# Patient Record
Sex: FEMALE | Race: WHITE | NOT HISPANIC OR LATINO | URBAN - METROPOLITAN AREA
[De-identification: names, ages, dates, MRNs, and addresses within clinical notes are randomized per-mention and may not be internally consistent; named-entity substitution may affect disease eponyms.]

---

## 2017-08-07 ENCOUNTER — HISTORICAL (OUTPATIENT)
Dept: LAB | Facility: HOSPITAL | Age: 52
End: 2017-08-07

## 2017-08-07 LAB
ABS NEUT (OLG): NORMAL X10(3)/MCL (ref 2.1–9.2)
ALBUMIN SERPL-MCNC: 3.7 GM/DL (ref 3.4–5)
ALP SERPL-CCNC: 128 UNIT/L (ref 46–116)
ALT SERPL-CCNC: 22 UNIT/L (ref 12–78)
AST SERPL-CCNC: 16 UNIT/L (ref 15–37)
BASOPHILS # BLD AUTO: NORMAL X10(3)/MCL
BASOPHILS NFR BLD AUTO: NORMAL % (ref 0–2)
BILIRUB SERPL-MCNC: 0.5 MG/DL (ref 0.2–1)
BILIRUBIN DIRECT+TOT PNL SERPL-MCNC: 0.09 MG/DL (ref 0–0.2)
BILIRUBIN DIRECT+TOT PNL SERPL-MCNC: 0.41 MG/DL (ref 0–0.8)
BUN SERPL-MCNC: NORMAL MG/DL (ref 7–18)
CALCIUM SERPL-MCNC: NORMAL MG/DL (ref 8.5–10.1)
CHLORIDE SERPL-SCNC: NORMAL MMOL/L (ref 98–107)
CHOLEST SERPL-MCNC: 213 MG/DL (ref 0–200)
CHOLEST/HDLC SERPL: 5.2 {RATIO} (ref 0–4)
CO2 SERPL-SCNC: NORMAL MMOL/L (ref 21–32)
CREAT SERPL-MCNC: NORMAL MG/DL (ref 0.6–1.3)
EOSINOPHIL # BLD AUTO: NORMAL X10(3)/MCL
EOSINOPHIL NFR BLD AUTO: NORMAL %
ERYTHROCYTE [DISTWIDTH] IN BLOOD BY AUTOMATED COUNT: NORMAL % (ref 11.5–17)
GLUCOSE SERPL-MCNC: NORMAL MG/DL (ref 74–106)
HCT VFR BLD AUTO: NORMAL % (ref 37–47)
HDLC SERPL-MCNC: 41 MG/DL (ref 40–60)
HGB BLD-MCNC: NORMAL GM/DL (ref 12–16)
LDLC SERPL CALC-MCNC: 156 MG/DL (ref 0–129)
LYMPHOCYTES # BLD AUTO: NORMAL X10(3)/MCL
LYMPHOCYTES NFR BLD AUTO: NORMAL % (ref 13–40)
MCH RBC QN AUTO: NORMAL PG (ref 27–31)
MCHC RBC AUTO-ENTMCNC: NORMAL GM/DL (ref 33–36)
MCV RBC AUTO: NORMAL FL (ref 80–94)
MONOCYTES # BLD AUTO: NORMAL X10(3)/MCL
MONOCYTES NFR BLD AUTO: NORMAL % (ref 2–11)
NEUTROPHILS # BLD AUTO: NORMAL X10(3)/MCL (ref 2.1–9.2)
NEUTROPHILS NFR BLD AUTO: NORMAL % (ref 47–80)
PLATELET # BLD AUTO: NORMAL X10(3)/MCL (ref 130–400)
PMV BLD AUTO: NORMAL FL (ref 7.4–10.4)
POTASSIUM SERPL-SCNC: NORMAL MMOL/L (ref 3.5–5.1)
PROT SERPL-MCNC: 6.4 GM/DL (ref 6.4–8.2)
RBC # BLD AUTO: NORMAL X10(6)/MCL (ref 4.2–5.4)
SODIUM SERPL-SCNC: NORMAL MMOL/L (ref 136–145)
TRIGL SERPL-MCNC: 79 MG/DL
VLDLC SERPL CALC-MCNC: 16 MG/DL
WBC # SPEC AUTO: NORMAL X10(3)/MCL (ref 4.5–11.5)

## 2019-02-06 ENCOUNTER — HISTORICAL (OUTPATIENT)
Dept: LAB | Facility: HOSPITAL | Age: 54
End: 2019-02-06

## 2019-02-06 LAB
APTT PPP: 31.4 SECOND(S) (ref 24.8–36.9)
DEPRECATED CALCIDIOL+CALCIFEROL SERPL-MC: 20.89 NG/ML (ref 30–80)
ERYTHROCYTE [SEDIMENTATION RATE] IN BLOOD: 13 MM/HR (ref 0–20)
FERRITIN SERPL-MCNC: 141.2 NG/ML (ref 8–388)
HBV CORE IGM SERPL QL IA: NEGATIVE
HBV SURFACE AG SERPL QL IA: NEGATIVE
HCV AB SERPL QL IA: NEGATIVE
INR PPP: 1 (ref 0–1.3)
PROTHROMBIN TIME: 13.4 SECOND(S) (ref 12.2–14.7)
VIT B12 SERPL-MCNC: 584 PG/ML (ref 193–986)

## 2019-10-12 ENCOUNTER — HISTORICAL (OUTPATIENT)
Dept: LAB | Facility: HOSPITAL | Age: 54
End: 2019-10-12

## 2019-10-12 LAB
ALBUMIN SERPL-MCNC: 3.6 GM/DL (ref 3.4–5)
ALP SERPL-CCNC: 135 UNIT/L (ref 46–116)
ALT SERPL-CCNC: 20 UNIT/L (ref 12–78)
AST SERPL-CCNC: 17 UNIT/L (ref 15–37)
BILIRUB SERPL-MCNC: 0.4 MG/DL (ref 0.2–1)
BILIRUBIN DIRECT+TOT PNL SERPL-MCNC: 0.1 MG/DL (ref 0–0.2)
BILIRUBIN DIRECT+TOT PNL SERPL-MCNC: 0.3 MG/DL (ref 0–0.8)
CHOLEST SERPL-MCNC: 204 MG/DL (ref 0–200)
CHOLEST/HDLC SERPL: 5.4 {RATIO} (ref 0–4)
HDLC SERPL-MCNC: 38 MG/DL (ref 40–60)
LDLC SERPL CALC-MCNC: 129 MG/DL (ref 0–129)
PROT SERPL-MCNC: 6.5 GM/DL (ref 6.4–8.2)
TRIGL SERPL-MCNC: 185 MG/DL
VLDLC SERPL CALC-MCNC: 37 MG/DL

## 2020-08-21 ENCOUNTER — HISTORICAL (OUTPATIENT)
Dept: ADMINISTRATIVE | Facility: HOSPITAL | Age: 55
End: 2020-08-21

## 2020-08-21 LAB
APPEARANCE, UA: ABNORMAL
BACTERIA SPEC CULT: ABNORMAL /HPF
BILIRUB UR QL STRIP: NEGATIVE
COLOR UR: YELLOW
GLUCOSE (UA): NEGATIVE
HGB UR QL STRIP: ABNORMAL
KETONES UR QL STRIP: NEGATIVE
LEUKOCYTE ESTERASE UR QL STRIP: NEGATIVE
NITRITE UR QL STRIP: NEGATIVE
PH UR STRIP: 5.5 [PH] (ref 5–9)
PROT UR QL STRIP: NEGATIVE
RBC #/AREA URNS HPF: ABNORMAL /[HPF]
SP GR UR STRIP: 1.02 (ref 1–1.03)
SQUAMOUS EPITHELIAL, UA: ABNORMAL
UROBILINOGEN UR STRIP-ACNC: 0.2
WBC #/AREA URNS HPF: ABNORMAL /[HPF]

## 2020-12-11 ENCOUNTER — HISTORICAL (OUTPATIENT)
Dept: LAB | Facility: HOSPITAL | Age: 55
End: 2020-12-11

## 2020-12-11 LAB
ABS NEUT (OLG): 3.86 X10(3)/MCL (ref 2.1–9.2)
ALBUMIN SERPL-MCNC: 3.8 GM/DL (ref 3.5–5)
ALBUMIN/GLOB SERPL: 1.3 RATIO (ref 1.1–2)
ALP SERPL-CCNC: 149 UNIT/L (ref 40–150)
ALT SERPL-CCNC: 13 UNIT/L (ref 0–55)
APPEARANCE, UA: CLEAR
AST SERPL-CCNC: 15 UNIT/L (ref 5–34)
BACTERIA SPEC CULT: ABNORMAL
BASOPHILS # BLD AUTO: 0 X10(3)/MCL (ref 0–0.2)
BASOPHILS NFR BLD AUTO: 0 %
BILIRUB SERPL-MCNC: 0.4 MG/DL
BILIRUB UR QL STRIP: NEGATIVE
BILIRUBIN DIRECT+TOT PNL SERPL-MCNC: 0.2 MG/DL (ref 0–0.5)
BILIRUBIN DIRECT+TOT PNL SERPL-MCNC: 0.2 MG/DL (ref 0–0.8)
BUN SERPL-MCNC: 11.2 MG/DL (ref 9.8–20.1)
CALCIUM SERPL-MCNC: 9.3 MG/DL (ref 8.4–10.2)
CHLORIDE SERPL-SCNC: 106 MMOL/L (ref 98–107)
CHOLEST SERPL-MCNC: 198 MG/DL
CHOLEST/HDLC SERPL: 5 {RATIO} (ref 0–5)
CO2 SERPL-SCNC: 30 MMOL/L (ref 22–29)
COLOR UR: YELLOW
CREAT SERPL-MCNC: 0.62 MG/DL (ref 0.55–1.02)
DEPRECATED CALCIDIOL+CALCIFEROL SERPL-MC: 54.4 NG/ML (ref 30–80)
EOSINOPHIL # BLD AUTO: 0.1 X10(3)/MCL (ref 0–0.9)
EOSINOPHIL NFR BLD AUTO: 2 %
ERYTHROCYTE [DISTWIDTH] IN BLOOD BY AUTOMATED COUNT: 11.8 % (ref 11.5–17)
EST. AVERAGE GLUCOSE BLD GHB EST-MCNC: 102.5 MG/DL
GLOBULIN SER-MCNC: 2.9 GM/DL (ref 2.4–3.5)
GLUCOSE (UA): NEGATIVE
GLUCOSE SERPL-MCNC: 93 MG/DL (ref 74–100)
HBA1C MFR BLD: 5.2 %
HCT VFR BLD AUTO: 57.5 % (ref 37–47)
HDLC SERPL-MCNC: 42 MG/DL (ref 35–60)
HGB BLD-MCNC: 17.5 GM/DL (ref 12–16)
HGB UR QL STRIP: NEGATIVE
IMM GRANULOCYTES # BLD AUTO: 0.01 % (ref 0–0.02)
IMM GRANULOCYTES NFR BLD AUTO: 0.2 % (ref 0–0.43)
KETONES UR QL STRIP: NEGATIVE
LDLC SERPL CALC-MCNC: 125 MG/DL (ref 50–140)
LEUKOCYTE ESTERASE UR QL STRIP: NEGATIVE
LYMPHOCYTES # BLD AUTO: 1.9 X10(3)/MCL (ref 0.6–4.6)
LYMPHOCYTES NFR BLD AUTO: 31 %
MCH RBC QN AUTO: 29.2 PG (ref 27–31)
MCHC RBC AUTO-ENTMCNC: 30.4 GM/DL (ref 33–36)
MCV RBC AUTO: 95.8 FL (ref 80–94)
MONOCYTES # BLD AUTO: 0.2 X10(3)/MCL (ref 0.1–1.3)
MONOCYTES NFR BLD AUTO: 4 %
NEUTROPHILS # BLD AUTO: 3.86 X10(3)/MCL (ref 1.4–7.9)
NEUTROPHILS NFR BLD AUTO: 63 %
NITRITE UR QL STRIP: NEGATIVE
PH UR STRIP: 7 [PH] (ref 5–9)
PLATELET # BLD AUTO: 214 X10(3)/MCL (ref 130–400)
PMV BLD AUTO: 11.5 FL (ref 9.4–12.4)
POTASSIUM SERPL-SCNC: 4.7 MMOL/L (ref 3.5–5.1)
PROT SERPL-MCNC: 6.7 GM/DL (ref 6.4–8.3)
PROT UR QL STRIP: NEGATIVE
RBC # BLD AUTO: 6 X10(6)/MCL (ref 4.2–5.4)
RBC #/AREA URNS HPF: ABNORMAL /[HPF]
SODIUM SERPL-SCNC: 142 MMOL/L (ref 136–145)
SP GR UR STRIP: 1.02 (ref 1–1.03)
SQUAMOUS EPITHELIAL, UA: ABNORMAL
TRIGL SERPL-MCNC: 156 MG/DL (ref 37–140)
TSH SERPL-ACNC: 2.86 UIU/ML (ref 0.35–4.94)
UROBILINOGEN UR STRIP-ACNC: 0.2
VLDLC SERPL CALC-MCNC: 31 MG/DL
WBC # SPEC AUTO: 6.1 X10(3)/MCL (ref 4.5–11.5)
WBC #/AREA URNS HPF: ABNORMAL /[HPF]

## 2020-12-18 ENCOUNTER — HISTORICAL (OUTPATIENT)
Dept: LAB | Facility: HOSPITAL | Age: 55
End: 2020-12-18

## 2020-12-18 LAB
ABS NEUT (OLG): 5.75 X10(3)/MCL (ref 2.1–9.2)
BASOPHILS # BLD AUTO: 0 X10(3)/MCL (ref 0–0.2)
BASOPHILS NFR BLD AUTO: 0 %
EOSINOPHIL # BLD AUTO: 0.2 X10(3)/MCL (ref 0–0.9)
EOSINOPHIL NFR BLD AUTO: 2 %
ERYTHROCYTE [DISTWIDTH] IN BLOOD BY AUTOMATED COUNT: 11.7 % (ref 11.5–17)
FERRITIN SERPL-MCNC: 165.2 NG/ML (ref 4.63–204)
HCT VFR BLD AUTO: 43.8 % (ref 37–47)
HGB BLD-MCNC: 13.8 GM/DL (ref 12–16)
IMM GRANULOCYTES # BLD AUTO: 0.02 % (ref 0–0.02)
IMM GRANULOCYTES NFR BLD AUTO: 0.2 % (ref 0–0.43)
LYMPHOCYTES # BLD AUTO: 2.5 X10(3)/MCL (ref 0.6–4.6)
LYMPHOCYTES NFR BLD AUTO: 28 %
MCH RBC QN AUTO: 29.6 PG (ref 27–31)
MCHC RBC AUTO-ENTMCNC: 31.5 GM/DL (ref 33–36)
MCV RBC AUTO: 93.8 FL (ref 80–94)
MONOCYTES # BLD AUTO: 0.6 X10(3)/MCL (ref 0.1–1.3)
MONOCYTES NFR BLD AUTO: 6 %
NEUTROPHILS # BLD AUTO: 5.75 X10(3)/MCL (ref 1.4–7.9)
NEUTROPHILS NFR BLD AUTO: 64 %
PLATELET # BLD AUTO: 320 X10(3)/MCL (ref 130–400)
PMV BLD AUTO: 11.5 FL (ref 9.4–12.4)
RBC # BLD AUTO: 4.67 X10(6)/MCL (ref 4.2–5.4)
WBC # SPEC AUTO: 9 X10(3)/MCL (ref 4.5–11.5)

## 2021-04-28 ENCOUNTER — HISTORICAL (OUTPATIENT)
Dept: LAB | Facility: HOSPITAL | Age: 56
End: 2021-04-28

## 2021-04-28 LAB
ABS NEUT (OLG): 5.37 X10(3)/MCL (ref 2.1–9.2)
ALBUMIN SERPL-MCNC: 4 GM/DL (ref 3.5–5)
ALBUMIN/GLOB SERPL: 1.5 RATIO (ref 1.1–2)
ALP SERPL-CCNC: 143 UNIT/L (ref 40–150)
ALT SERPL-CCNC: 11 UNIT/L (ref 0–55)
AST SERPL-CCNC: 13 UNIT/L (ref 5–34)
BASOPHILS # BLD AUTO: 0 X10(3)/MCL (ref 0–0.2)
BASOPHILS NFR BLD AUTO: 0 %
BILIRUB SERPL-MCNC: 0.2 MG/DL
BILIRUBIN DIRECT+TOT PNL SERPL-MCNC: <0.1 MG/DL (ref 0–0.5)
BILIRUBIN DIRECT+TOT PNL SERPL-MCNC: >0.1 MG/DL (ref 0–0.8)
BUN SERPL-MCNC: 9.5 MG/DL (ref 9.8–20.1)
CALCIUM SERPL-MCNC: 9.5 MG/DL (ref 8.4–10.2)
CHLORIDE SERPL-SCNC: 104 MMOL/L (ref 98–107)
CO2 SERPL-SCNC: 27 MMOL/L (ref 22–29)
CREAT SERPL-MCNC: 0.6 MG/DL (ref 0.55–1.02)
EOSINOPHIL # BLD AUTO: 0.2 X10(3)/MCL (ref 0–0.9)
EOSINOPHIL NFR BLD AUTO: 2 %
ERYTHROCYTE [DISTWIDTH] IN BLOOD BY AUTOMATED COUNT: 11.8 % (ref 11.5–17)
GLOBULIN SER-MCNC: 2.7 GM/DL (ref 2.4–3.5)
GLUCOSE SERPL-MCNC: 88 MG/DL (ref 74–100)
HCT VFR BLD AUTO: 42 % (ref 37–47)
HGB BLD-MCNC: 13.8 GM/DL (ref 12–16)
IMM GRANULOCYTES # BLD AUTO: 0.01 % (ref 0–0.02)
IMM GRANULOCYTES NFR BLD AUTO: 0.1 % (ref 0–0.43)
LYMPHOCYTES # BLD AUTO: 3.2 X10(3)/MCL (ref 0.6–4.6)
LYMPHOCYTES NFR BLD AUTO: 34 %
MCH RBC QN AUTO: 29.9 PG (ref 27–31)
MCHC RBC AUTO-ENTMCNC: 32.9 GM/DL (ref 33–36)
MCV RBC AUTO: 90.9 FL (ref 80–94)
MONOCYTES # BLD AUTO: 0.6 X10(3)/MCL (ref 0.1–1.3)
MONOCYTES NFR BLD AUTO: 6 %
NEUTROPHILS # BLD AUTO: 5.37 X10(3)/MCL (ref 1.4–7.9)
NEUTROPHILS NFR BLD AUTO: 57 %
PLATELET # BLD AUTO: 302 X10(3)/MCL (ref 130–400)
PMV BLD AUTO: 11.1 FL (ref 9.4–12.4)
POTASSIUM SERPL-SCNC: 5.1 MMOL/L (ref 3.5–5.1)
PROT SERPL-MCNC: 6.7 GM/DL (ref 6.4–8.3)
RBC # BLD AUTO: 4.62 X10(6)/MCL (ref 4.2–5.4)
SODIUM SERPL-SCNC: 141 MMOL/L (ref 136–145)
TSH SERPL-ACNC: 2.46 UIU/ML (ref 0.35–4.94)
WBC # SPEC AUTO: 9.4 X10(3)/MCL (ref 4.5–11.5)

## 2021-09-09 ENCOUNTER — HISTORICAL (OUTPATIENT)
Dept: ADMINISTRATIVE | Facility: HOSPITAL | Age: 56
End: 2021-09-09

## 2021-09-09 ENCOUNTER — HISTORICAL (OUTPATIENT)
Dept: RADIOLOGY | Facility: HOSPITAL | Age: 56
End: 2021-09-09

## 2021-09-09 LAB
APPEARANCE, UA: CLEAR
BACTERIA SPEC CULT: NORMAL /HPF
BILIRUB UR QL STRIP: NEGATIVE
COLOR UR: YELLOW
GLUCOSE (UA): NEGATIVE
HGB UR QL STRIP: NEGATIVE
KETONES UR QL STRIP: NEGATIVE
LEUKOCYTE ESTERASE UR QL STRIP: NEGATIVE
NITRITE UR QL STRIP: NEGATIVE
PH UR STRIP: 7 [PH] (ref 5–9)
PROT UR QL STRIP: NEGATIVE
RBC #/AREA URNS HPF: NORMAL /[HPF]
SP GR UR STRIP: 1.01 (ref 1–1.03)
SQUAMOUS EPITHELIAL, UA: NORMAL /HPF (ref 0–4)
UROBILINOGEN UR STRIP-ACNC: 0.2
WBC #/AREA URNS HPF: NORMAL /[HPF]

## 2021-12-20 ENCOUNTER — HISTORICAL (OUTPATIENT)
Dept: LAB | Facility: HOSPITAL | Age: 56
End: 2021-12-20

## 2021-12-20 LAB
ABS NEUT (OLG): 5.7 X10(3)/MCL (ref 2.1–9.2)
ALBUMIN SERPL-MCNC: 3.9 GM/DL (ref 3.5–5)
ALBUMIN/GLOB SERPL: 1.4 RATIO (ref 1.1–2)
ALP SERPL-CCNC: 126 UNIT/L (ref 40–150)
ALT SERPL-CCNC: 19 UNIT/L (ref 0–55)
APPEARANCE, UA: CLEAR
AST SERPL-CCNC: 20 UNIT/L (ref 5–34)
BACTERIA SPEC CULT: NORMAL
BASOPHILS # BLD AUTO: 0 X10(3)/MCL (ref 0–0.2)
BASOPHILS NFR BLD AUTO: 0 %
BILIRUB SERPL-MCNC: 0.5 MG/DL
BILIRUB UR QL STRIP: NEGATIVE
BILIRUBIN DIRECT+TOT PNL SERPL-MCNC: 0.2 MG/DL (ref 0–0.5)
BILIRUBIN DIRECT+TOT PNL SERPL-MCNC: 0.3 MG/DL (ref 0–0.8)
BUN SERPL-MCNC: 10.7 MG/DL (ref 9.8–20.1)
CALCIUM SERPL-MCNC: 9.6 MG/DL (ref 8.7–10.5)
CHLORIDE SERPL-SCNC: 105 MMOL/L (ref 98–107)
CHOLEST SERPL-MCNC: 208 MG/DL
CHOLEST/HDLC SERPL: 5 {RATIO} (ref 0–5)
CO2 SERPL-SCNC: 28 MMOL/L (ref 22–29)
COLOR UR: YELLOW
CREAT SERPL-MCNC: 0.68 MG/DL (ref 0.55–1.02)
DEPRECATED CALCIDIOL+CALCIFEROL SERPL-MC: 35.9 NG/ML (ref 30–80)
EOSINOPHIL # BLD AUTO: 0.2 X10(3)/MCL (ref 0–0.9)
EOSINOPHIL NFR BLD AUTO: 2 %
ERYTHROCYTE [DISTWIDTH] IN BLOOD BY AUTOMATED COUNT: 12.2 % (ref 11.5–17)
FOLATE SERPL-MCNC: 9.7 NG/ML (ref 7–31.4)
GLOBULIN SER-MCNC: 2.7 GM/DL (ref 2.4–3.5)
GLUCOSE (UA): NEGATIVE
GLUCOSE SERPL-MCNC: 97 MG/DL (ref 74–100)
HCT VFR BLD AUTO: 42.8 % (ref 37–47)
HDLC SERPL-MCNC: 45 MG/DL (ref 35–60)
HGB BLD-MCNC: 13.5 GM/DL (ref 12–16)
HGB UR QL STRIP: NORMAL
IMM GRANULOCYTES # BLD AUTO: 0.02 % (ref 0–0.02)
IMM GRANULOCYTES NFR BLD AUTO: 0.2 % (ref 0–0.43)
KETONES UR QL STRIP: NEGATIVE
LDLC SERPL CALC-MCNC: 138 MG/DL (ref 50–140)
LEUKOCYTE ESTERASE UR QL STRIP: NEGATIVE
LYMPHOCYTES # BLD AUTO: 2.5 X10(3)/MCL (ref 0.6–4.6)
LYMPHOCYTES NFR BLD AUTO: 28 %
MCH RBC QN AUTO: 29.3 PG (ref 27–31)
MCHC RBC AUTO-ENTMCNC: 31.5 GM/DL (ref 33–36)
MCV RBC AUTO: 92.8 FL (ref 80–94)
MONOCYTES # BLD AUTO: 0.4 X10(3)/MCL (ref 0.1–1.3)
MONOCYTES NFR BLD AUTO: 5 %
NEUTROPHILS # BLD AUTO: 5.7 X10(3)/MCL (ref 1.4–7.9)
NEUTROPHILS NFR BLD AUTO: 64 %
NITRITE UR QL STRIP: NEGATIVE
PH UR STRIP: 6.5 [PH] (ref 5–9)
PLATELET # BLD AUTO: 326 X10(3)/MCL (ref 130–400)
PMV BLD AUTO: 10.7 FL (ref 9.4–12.4)
POTASSIUM SERPL-SCNC: 4.3 MMOL/L (ref 3.5–5.1)
PROT SERPL-MCNC: 6.6 GM/DL (ref 6.4–8.3)
PROT UR QL STRIP: NEGATIVE
RBC # BLD AUTO: 4.61 X10(6)/MCL (ref 4.2–5.4)
RBC #/AREA URNS HPF: NORMAL /HPF
SODIUM SERPL-SCNC: 142 MMOL/L (ref 136–145)
SP GR UR STRIP: 1.02 (ref 1–1.03)
SQUAMOUS EPITHELIAL, UA: NORMAL
TRIGL SERPL-MCNC: 123 MG/DL (ref 37–140)
TSH SERPL-ACNC: 2.65 UIU/ML (ref 0.35–4.94)
UROBILINOGEN UR STRIP-ACNC: 0.2
VIT B12 SERPL-MCNC: 679 PG/ML (ref 213–816)
VLDLC SERPL CALC-MCNC: 25 MG/DL
WBC # SPEC AUTO: 8.9 X10(3)/MCL (ref 4.5–11.5)
WBC #/AREA URNS HPF: NORMAL /[HPF]

## 2022-04-10 ENCOUNTER — HISTORICAL (OUTPATIENT)
Dept: ADMINISTRATIVE | Facility: HOSPITAL | Age: 57
End: 2022-04-10

## 2022-04-26 VITALS
SYSTOLIC BLOOD PRESSURE: 120 MMHG | BODY MASS INDEX: 21.25 KG/M2 | DIASTOLIC BLOOD PRESSURE: 66 MMHG | OXYGEN SATURATION: 97 % | HEIGHT: 63 IN | WEIGHT: 119.94 LBS

## 2022-12-02 PROBLEM — D75.1 ERYTHROCYTOSIS: Status: ACTIVE | Noted: 2022-12-02

## 2022-12-02 PROBLEM — M79.7 FIBROMYALGIA: Status: ACTIVE | Noted: 2022-12-02

## 2022-12-02 PROBLEM — K14.6 GLOSSODYNIA: Status: ACTIVE | Noted: 2022-12-02

## 2022-12-02 PROBLEM — M54.50 LOW BACK PAIN: Status: ACTIVE | Noted: 2022-12-02

## 2022-12-02 PROBLEM — Z00.00 WELLNESS EXAMINATION: Status: ACTIVE | Noted: 2022-12-02

## 2022-12-02 PROBLEM — J30.9 ALLERGIC RHINITIS: Status: ACTIVE | Noted: 2022-12-02

## 2022-12-21 ENCOUNTER — PATIENT MESSAGE (OUTPATIENT)
Dept: CARDIOLOGY | Facility: HOSPITAL | Age: 57
End: 2022-12-21
Payer: COMMERCIAL

## 2022-12-22 ENCOUNTER — HOSPITAL ENCOUNTER (OUTPATIENT)
Facility: HOSPITAL | Age: 57
Discharge: HOME | End: 2022-12-22
Attending: INTERNAL MEDICINE | Admitting: INTERNAL MEDICINE
Payer: COMMERCIAL

## 2022-12-22 VITALS
HEIGHT: 61 IN | SYSTOLIC BLOOD PRESSURE: 116 MMHG | WEIGHT: 106.5 LBS | DIASTOLIC BLOOD PRESSURE: 63 MMHG | BODY MASS INDEX: 20.11 KG/M2 | OXYGEN SATURATION: 98 % | HEART RATE: 65 BPM | TEMPERATURE: 98 F

## 2022-12-22 DIAGNOSIS — I20.89 ANGINA OF EFFORT: ICD-10-CM

## 2022-12-22 DIAGNOSIS — F17.200 SMOKER: ICD-10-CM

## 2022-12-22 PROCEDURE — 25500020 PHARM REV CODE 255: Performed by: INTERNAL MEDICINE

## 2022-12-22 PROCEDURE — C1894 INTRO/SHEATH, NON-LASER: HCPCS | Performed by: INTERNAL MEDICINE

## 2022-12-22 PROCEDURE — 25000003 PHARM REV CODE 250: Performed by: INTERNAL MEDICINE

## 2022-12-22 PROCEDURE — 27201423 OPTIME MED/SURG SUP & DEVICES STERILE SUPPLY: Performed by: INTERNAL MEDICINE

## 2022-12-22 PROCEDURE — 99152 MOD SED SAME PHYS/QHP 5/>YRS: CPT | Performed by: INTERNAL MEDICINE

## 2022-12-22 PROCEDURE — 93010 ELECTROCARDIOGRAM REPORT: CPT | Mod: ,,, | Performed by: INTERNAL MEDICINE

## 2022-12-22 PROCEDURE — C1769 GUIDE WIRE: HCPCS | Performed by: INTERNAL MEDICINE

## 2022-12-22 PROCEDURE — 63600175 PHARM REV CODE 636 W HCPCS: Performed by: INTERNAL MEDICINE

## 2022-12-22 PROCEDURE — 93458 L HRT ARTERY/VENTRICLE ANGIO: CPT | Performed by: INTERNAL MEDICINE

## 2022-12-22 PROCEDURE — C1887 CATHETER, GUIDING: HCPCS | Performed by: INTERNAL MEDICINE

## 2022-12-22 PROCEDURE — 93010 EKG 12-LEAD: ICD-10-PCS | Mod: ,,, | Performed by: INTERNAL MEDICINE

## 2022-12-22 PROCEDURE — 93005 ELECTROCARDIOGRAM TRACING: CPT

## 2022-12-22 RX ORDER — SODIUM CHLORIDE 0.9 % (FLUSH) 0.9 %
10 SYRINGE (ML) INJECTION
Status: DISCONTINUED | OUTPATIENT
Start: 2022-12-22 | End: 2022-12-22 | Stop reason: HOSPADM

## 2022-12-22 RX ORDER — LIDOCAINE HYDROCHLORIDE 20 MG/ML
INJECTION, SOLUTION INFILTRATION; PERINEURAL
Status: DISCONTINUED | OUTPATIENT
Start: 2022-12-22 | End: 2022-12-22 | Stop reason: HOSPADM

## 2022-12-22 RX ORDER — HEPARIN SODIUM 1000 [USP'U]/ML
INJECTION, SOLUTION INTRAVENOUS; SUBCUTANEOUS
Status: DISCONTINUED | OUTPATIENT
Start: 2022-12-22 | End: 2022-12-22 | Stop reason: HOSPADM

## 2022-12-22 RX ORDER — ROSUVASTATIN CALCIUM 20 MG/1
20 TABLET, COATED ORAL NIGHTLY
COMMUNITY
Start: 2022-12-20

## 2022-12-22 RX ORDER — SODIUM CHLORIDE 9 MG/ML
INJECTION, SOLUTION INTRAVENOUS ONCE
Status: DISCONTINUED | OUTPATIENT
Start: 2022-12-22 | End: 2022-12-22 | Stop reason: HOSPADM

## 2022-12-22 RX ORDER — FAMOTIDINE 20 MG/1
40 TABLET, FILM COATED ORAL
COMMUNITY
End: 2023-01-04 | Stop reason: ALTCHOICE

## 2022-12-22 RX ORDER — ASPIRIN 81 MG/1
81 TABLET ORAL NIGHTLY
COMMUNITY

## 2022-12-22 RX ORDER — DIPHENHYDRAMINE HCL 25 MG
50 CAPSULE ORAL
Status: DISCONTINUED | OUTPATIENT
Start: 2022-12-22 | End: 2022-12-22 | Stop reason: HOSPADM

## 2022-12-22 RX ORDER — MIDAZOLAM HYDROCHLORIDE 1 MG/ML
INJECTION INTRAMUSCULAR; INTRAVENOUS
Status: DISCONTINUED | OUTPATIENT
Start: 2022-12-22 | End: 2022-12-22 | Stop reason: HOSPADM

## 2022-12-22 RX ORDER — FENTANYL CITRATE 50 UG/ML
INJECTION, SOLUTION INTRAMUSCULAR; INTRAVENOUS
Status: DISCONTINUED | OUTPATIENT
Start: 2022-12-22 | End: 2022-12-22 | Stop reason: HOSPADM

## 2022-12-22 RX ORDER — CIMETIDINE 300 MG/1
300 TABLET, FILM COATED ORAL
Status: ON HOLD | COMMUNITY
End: 2022-12-22

## 2022-12-22 RX ORDER — DIPHENHYDRAMINE HCL 25 MG
50 CAPSULE ORAL
COMMUNITY
End: 2023-01-04 | Stop reason: ALTCHOICE

## 2022-12-22 RX ORDER — METHYLPREDNISOLONE 4 MG/1
32 TABLET ORAL
COMMUNITY
Start: 2022-12-20 | End: 2023-01-04 | Stop reason: ALTCHOICE

## 2022-12-22 RX ORDER — CHOLECALCIFEROL (VITAMIN D3) 25 MCG
1000 TABLET ORAL NIGHTLY
COMMUNITY

## 2022-12-22 RX ORDER — SODIUM CHLORIDE 9 MG/ML
INJECTION, SOLUTION INTRAVENOUS ONCE
Status: COMPLETED | OUTPATIENT
Start: 2022-12-22 | End: 2022-12-22

## 2022-12-22 RX ORDER — VERAPAMIL HYDROCHLORIDE 2.5 MG/ML
INJECTION, SOLUTION INTRAVENOUS
Status: DISCONTINUED | OUTPATIENT
Start: 2022-12-22 | End: 2022-12-22 | Stop reason: HOSPADM

## 2022-12-22 RX ORDER — NITROGLYCERIN 0.4 MG/1
0.4 TABLET SUBLINGUAL EVERY 5 MIN PRN
COMMUNITY
Start: 2022-11-23

## 2022-12-22 RX ORDER — DIAZEPAM 5 MG/1
10 TABLET ORAL
Status: DISCONTINUED | OUTPATIENT
Start: 2022-12-22 | End: 2022-12-22 | Stop reason: HOSPADM

## 2022-12-22 RX ORDER — NITROGLYCERIN 20 MG/100ML
INJECTION INTRAVENOUS
Status: DISCONTINUED | OUTPATIENT
Start: 2022-12-22 | End: 2022-12-22 | Stop reason: HOSPADM

## 2022-12-22 RX ADMIN — SODIUM CHLORIDE: 9 INJECTION, SOLUTION INTRAVENOUS at 06:12

## 2022-12-22 RX ADMIN — DIAZEPAM 10 MG: 5 TABLET ORAL at 06:12

## 2022-12-22 NOTE — DISCHARGE SUMMARY
[facility] - Cath Lab Services  Discharge Note  Short Stay    Procedure(s) (LRB):  CATHETERIZATION, HEART, LEFT (Left)      OUTCOME: Patient tolerated treatment/procedure well without complication and is now ready for discharge.    DISPOSITION: Home or Self Care    FINAL DIAGNOSIS:  <principal problem not specified>    FOLLOWUP: In clinic    DISCHARGE INSTRUCTIONS:  No discharge procedures on file.     TIME SPENT ON DISCHARGE: 31 minutes

## 2022-12-22 NOTE — INTERVAL H&P NOTE
Patient name: Gauri Evans  MRN: [id number]  : 1965  Cath Lab Procedure H&P Update    Pre-Procedure Assessment:    I saw and examined the patient face to face. The patient has been re-evaluated and her condition is unchanged. The reason for admission, procedure and care is still present.  Based on the patients H&P, pre-procedure physical exam, relevant diagnostic studies, NPO status and information obtained from the patient, I determine the patient is an appropriate candidate for the proposed procedure and anesthesia planned. I further certify the anesthesia risks, benefits and options have been explained to the patient to which she agrees as documented on the procedural consent.

## 2022-12-22 NOTE — Clinical Note
The catheter was inserted into the and was inserted over the wire into the left ventricle. Hemodynamics were performed.  and Pullback was recorded.  An angiography was performed of the LV. The angiography was performed via power injection.  70% EF

## 2022-12-22 NOTE — DISCHARGE INSTRUCTIONS
Remove dressing and armboard in 24hrs. After removing armboard, do not excessively bend wrist.  - Can shower in 24hrs, use soap and water only.   -No driving for two Days  -Do not lift anything heavier than a gallon of milk for 5 days.  -Do not submerge site under water for 5 days.   -No lotions, powders, creams around site for 5 days.  - Return to the nearest emergency room if you start running a fever; have any kind of discharge coming from the site, the site looks red or swollen.  - If site starts to bleed, apply pressure to the site and call 911.

## 2022-12-22 NOTE — Clinical Note
The catheter was repositioned into the ostium   right coronary artery. Hemodynamics were performed.  An angiography was performed of the right coronary arteries. Multiple views were taken. The angiography was performed via power injection.

## 2022-12-23 ENCOUNTER — PATIENT MESSAGE (OUTPATIENT)
Dept: ADMINISTRATIVE | Facility: OTHER | Age: 57
End: 2022-12-23
Payer: COMMERCIAL

## 2022-12-26 PROBLEM — E78.5 HYPERLIPIDEMIA: Status: ACTIVE | Noted: 2022-12-26

## 2023-03-06 PROBLEM — Z00.00 WELLNESS EXAMINATION: Status: RESOLVED | Noted: 2022-12-02 | Resolved: 2023-03-06

## 2024-10-09 ENCOUNTER — TELEPHONE (OUTPATIENT)
Dept: PHARMACY | Facility: CLINIC | Age: 59
End: 2024-10-09
Payer: COMMERCIAL

## 2024-10-09 NOTE — TELEPHONE ENCOUNTER
[facility]/Population Health Chart Review & Patient Outreach Details For Medication Adherence Project    Reason for Outreach Encounter: 3rd Party payor non-compliance report ([employer], [employer], C, etc)  2.  Patient Outreach Method: Reviewed Patient Chart  3.   Medication in question: rosuvastatin   LAST FILLED: 10/3/24 for 30 day supply  Hyperlipidemia Medications               rosuvastatin (CRESTOR) 20 MG tablet Take 20 mg by mouth every evening.               4.  Reviewed and or Updates Made To: Patient Chart  5. Outreach Outcomes and/or actions taken: Patient filled medication and is on track to be adherent

## (undated) DEVICE — BAND TR COMP DEVICE REG 24CM

## (undated) DEVICE — DRAPE ANGIO BRACH 38X44IN

## (undated) DEVICE — GUIDEWIRE EMERALD STR 260CM

## (undated) DEVICE — Device

## (undated) DEVICE — CANNULA NASAL ADULT

## (undated) DEVICE — TUBING HP AIRLSS ROT ADPT 30IN

## (undated) DEVICE — CATH JACKY RADIAL 5FR 100CM

## (undated) DEVICE — KIT GLIDESHEATH SLEND 6FR 10CM

## (undated) DEVICE — PAD DEFIB CADENCE ADULT R2

## (undated) DEVICE — COVER PROBE US 5.5X58L NON LTX

## (undated) DEVICE — SET ANGIO ACIST CVI ANGIOTOUCH